# Patient Record
Sex: FEMALE | Race: WHITE | NOT HISPANIC OR LATINO | Employment: OTHER | RURAL
[De-identification: names, ages, dates, MRNs, and addresses within clinical notes are randomized per-mention and may not be internally consistent; named-entity substitution may affect disease eponyms.]

---

## 2020-06-08 ENCOUNTER — CONVERSION ENCOUNTER (OUTPATIENT)
Dept: CARDIOLOGY | Facility: CLINIC | Age: 80
End: 2020-06-08

## 2020-06-09 ENCOUNTER — OFFICE VISIT CONVERTED (OUTPATIENT)
Dept: CARDIOLOGY | Facility: CLINIC | Age: 80
End: 2020-06-09
Attending: INTERNAL MEDICINE

## 2021-05-10 NOTE — H&P
History and Physical      Patient Name: Edelmira Lemus   Patient ID: [id number]   Sex: Female   YOB: 1940    Primary Care Provider: No PCP No PCP Other   Referring Provider: Marshall Vora MD    Visit Date: June 9, 2020    Provider: Marshall Vora MD   Location: [facility]   Location Address: [address] [place] [address]  [id number]          Chief Complaint     Atrial fibrillation.  Hypertension.  Previous ischemic stroke.       History Of Present Illness  Consult requested by: Marshall Vora MD   Edelmira Lemus is a 79 year old female who is here for evaluation and management of atrial fibrillation, hypertension, and previous ischemic stroke. Edelmira is doing well. I have not seen her since 2018. She denies chest pain, palpitations, or excessive shortness of breath. She is compliant with her medications. No bleeding problems on anticoagulation. She has been on Flecainide for some time. This was increased to 75 mg b.i.d. last year, and she has not had any perceptible breakthrough arrhythmia.   PAST MEDICAL HISTORY: Atrial fibrillation; Depression; Hypertension; Prior ischemic stroke.   PSYCHOSOCIAL HISTORY: Previously smoked, but quit 22 years ago. Denies alcohol use. Rare caffeine intake. .   FAMILY HISTORY: Positive for hypertension. Negative for diabetes mellitus or heart disease.   CURRENT MEDICATIONS: Flecainide 75 mg b.i.d.; sertraline 1 tablet daily; Xarelto 20 mg daily; vitamin D daily. Dosage and frequency of the medications reviewed with the patient.   ALLERGIES: No known drug allergies.       Review of Systems  · Constitutional  o Admits  o : fatigue, good general health lately  o Denies  o : recent weight changes   · Eyes  o Denies  o : double vision  · HENT  o Denies  o : hearing loss or ringing, chronic sinus problem, swollen glands in neck  · Cardiovascular  o Denies  o : chest pain, palpitations (fast, fluttering, or skipping beats), swelling (feet, ankles,  "hands), shortness of breath while walking or lying flat  · Respiratory  o Denies  o : chronic or frequent cough, asthma or wheezing, COPD  · Gastrointestinal  o Denies  o : ulcers, nausea or vomiting  · Neurologic  o Admits  o : stroke  o Denies  o : lightheaded or dizzy, headaches  · Musculoskeletal  o Denies  o : joint pain, back pain  · Endocrine  o Denies  o : thyroid disease, diabetes, heat or cold intolerance, excessive thirst or urination  · Heme-Lymph  o Denies  o : bleeding or bruising tendency, anemia      Vitals  Date Time BP Position Site L\R Cuff Size HR RR TEMP (F) WT  HT  BMI kg/m2 BSA m2 O2 Sat HC       06/08/2020 11:30 /88 Sitting    55 - R  98.4 168lbs 0oz 5'  7\" 26.31 1.9     06/08/2020 11:31 /83 Sitting    54 - R                  Home readings in the 127/60s.       Physical Examination  · Constitutional  o Appearance  o : Elderly, White female, pleasant, in no acute distress.  · Head and Face  o HEENT  o : No pallor, anicteric. Eyes normal. Moist mucous membranes.  · Neck  o Inspection/Palpation  o : Supple. No hepatosplenomegaly.  o Jugular Veins  o : No JVD. No carotid bruits.  · Respiratory  o Auscultation of Lungs  o : Clear to auscultation bilaterally. No crackles or wheezing.  · Cardiovascular  o Heart  o : Soft parasternal systolic murmur.   · Gastrointestinal  o Abdominal Examination  o : Soft, non-distended. No palpable hepatosplenomegaly. Bowel sounds heard in all four quadrants.  · Musculoskeletal  o General  o : Normal muscle tone and strength.  · Skin and Subcutaneous Tissue  o General Inspection  o : No skin rashes.  · Extremities  o Extremities  o : Warm and well perfused. Distal pulses present. No pitting pedal edema.  · EKG  o EKG  o : Performed in the office today.  o Indications  o : Atrial fibrillation.  o Results  o : Sinus bradycardia. Left axis.  o Comparison  o : No previous EKG for comparison.   · Data  o Data  o : I reviewed her previous records from [facility].           Assessment     1.  Paroxysmal atrial fibrillation.  Clinically, the patient is currently controlled and stable on Flecainide therapy.         She is in sinus rhythm today.    2.  Embolic risk score elevated, chronic anticoagulation, stable.  3.  Hypertension, controlled by home readings.  4.  History of ischemic stroke.       Plan     Ms. Lemus is maintaining sinus rhythm.  I have refilled her Flecainide.  She will continue Xarelto therapy.  I have encouraged her to remain active and recommended a walking program.  She will follow up in one year unless symptoms arise.    It is a privilege to assist in her care.  Please let me know if you have any questions regarding her case.       FLAVIA Vora MD  CBD/vm             Electronically Signed by: Sandy Mejia-, Other -Author on Viry 15, 2020 09:39:07 AM  Electronically Co-signed by: Marshall Vora MD -Reviewer on Viry 15, 2020 12:32:32 PM

## 2021-05-15 VITALS
SYSTOLIC BLOOD PRESSURE: 157 MMHG | WEIGHT: 168 LBS | HEART RATE: 55 BPM | HEIGHT: 67 IN | TEMPERATURE: 98.4 F | DIASTOLIC BLOOD PRESSURE: 88 MMHG | BODY MASS INDEX: 26.37 KG/M2

## 2021-06-15 ENCOUNTER — OFFICE VISIT (OUTPATIENT)
Dept: CARDIOLOGY | Facility: CLINIC | Age: 81
End: 2021-06-15

## 2021-06-15 VITALS
WEIGHT: 161 LBS | SYSTOLIC BLOOD PRESSURE: 147 MMHG | HEIGHT: 66 IN | BODY MASS INDEX: 25.88 KG/M2 | DIASTOLIC BLOOD PRESSURE: 87 MMHG | HEART RATE: 54 BPM

## 2021-06-15 DIAGNOSIS — I63.9 ISCHEMIC STROKE (HCC): ICD-10-CM

## 2021-06-15 DIAGNOSIS — I10 ESSENTIAL HYPERTENSION: ICD-10-CM

## 2021-06-15 DIAGNOSIS — I48.0 PAROXYSMAL ATRIAL FIBRILLATION (HCC): Primary | ICD-10-CM

## 2021-06-15 PROCEDURE — 99213 OFFICE O/P EST LOW 20 MIN: CPT | Performed by: INTERNAL MEDICINE

## 2021-06-15 PROCEDURE — 93000 ELECTROCARDIOGRAM COMPLETE: CPT | Performed by: INTERNAL MEDICINE

## 2021-06-15 RX ORDER — SERTRALINE HYDROCHLORIDE 100 MG/1
100 TABLET, FILM COATED ORAL DAILY
COMMUNITY
Start: 2021-03-21

## 2021-06-15 RX ORDER — FLECAINIDE ACETATE 50 MG/1
50 TABLET ORAL DAILY
COMMUNITY
Start: 2021-05-20 | End: 2021-06-15 | Stop reason: SDUPTHER

## 2021-06-15 RX ORDER — RIVAROXABAN 20 MG/1
20 TABLET, FILM COATED ORAL DAILY
COMMUNITY
Start: 2021-04-02 | End: 2021-06-15 | Stop reason: SDUPTHER

## 2021-06-15 RX ORDER — FLECAINIDE ACETATE 50 MG/1
50 TABLET ORAL DAILY
Qty: 180 TABLET | Refills: 3 | Status: SHIPPED | OUTPATIENT
Start: 2021-06-15 | End: 2022-06-09 | Stop reason: SDUPTHER

## 2021-06-15 RX ORDER — RIVAROXABAN 20 MG/1
20 TABLET, FILM COATED ORAL DAILY
Qty: 90 TABLET | Refills: 3 | Status: SHIPPED | OUTPATIENT
Start: 2021-06-15 | End: 2022-06-13

## 2021-06-15 NOTE — PROGRESS NOTES
Name: Edelmira Lemus    Date: 6/15/2021  MRN:  [id number]  :  1940      REFERRING/PRIMARY PROVIDER:  Marylin Hawthorne MD      No chief complaint on file.  Chief complaint, follow-up    HPI: Edelmira Lemus is a 80 y.o. female who presents today for follow-up evaluation and management of palpitations, paroxysmal atrial fibrillation, chronic anticoagulant use.  Ms. Lemus is doing well.  No new health problems, no symptomatic atrial fibrillation over the past year.  No bleeding problems on anticoagulation.    Past Medical History:   Diagnosis Date   • Abnormal ECG    • Atrial fibrillation (CMS/HCC)    • Stroke (CMS/HCC)        History reviewed. No pertinent surgical history.    Social History     Socioeconomic History   • Marital status: Unknown     Spouse name: Not on file   • Number of children: Not on file   • Years of education: Not on file   • Highest education level: Not on file   Tobacco Use   • Smoking status: Never Smoker   Vaping Use   • Vaping Use: Never used   Substance and Sexual Activity   • Alcohol use: Never   • Drug use: Never       Family History   Problem Relation Age of Onset   • Heart disease Mother    • Hyperlipidemia Mother    • Hypertension Mother    • Heart disease Father    • Hyperlipidemia Father    • Hypertension Father         ROS:   Review of Systems   Constitutional: Negative.   HENT: Negative.    Cardiovascular: Negative.    Respiratory: Negative.    Hematologic/Lymphatic: Negative.        No Known Allergies      Current Outpatient Medications:   •  flecainide (TAMBOCOR) 50 MG tablet, Take 1 tablet by mouth Daily. 1 1/2 tabs every day, Disp: 180 tablet, Rfl: 3  •  sertraline (ZOLOFT) 100 MG tablet, Take 100 mg by mouth Daily., Disp: , Rfl:   •  vitamin D3 (Vitamin D) 125 MCG (5000 UT) capsule capsule, Take 5,000 Units by mouth Daily., Disp: , Rfl:   •  Xarelto 20 MG tablet, Take 1 tablet by mouth Daily., Disp: 90 tablet, Rfl: 3    Vitals:    06/15/21 1455 06/15/21 1456  "06/15/21 1457   BP: 149/91 150/93 147/87   Pulse: 56 54 54   Weight: 73 kg (161 lb)     Height: 167.6 cm (66\")       Body mass index is 25.99 kg/m².        PHYSICAL EXAM:      General Appearance:   · well developed  · well nourished  HENT:   · oropharynx moist  · lips not cyanotic  Neck:  · thyroid not enlarged  · supple  Respiratory:  · no respiratory distress  · normal breath sounds  · no rales  Cardiovascular:  · no jugular venous distention  · regular rhythm  · apical impulse normal  · S1 normal, S2 normal  · no S3, no S4   · no murmur  · no rub, no thrill  · carotid pulses normal; no bruit  · pedal pulses normal  · lower extremity edema: none    Psychiatric:  · judgement and insight appropriate  · normal mood and affect    RESULTS:       ECG 12 Lead    Date/Time: 6/15/2021 3:28 PM  Performed by: TRUONG Vora MD  Authorized by: TRUONG Vora MD   Comparison: compared with previous ECG   Comparison to previous ECG: No change from June 2020  Rhythm: sinus rhythm  Conduction: left anterior fascicular block  QRS axis: left  Other findings: left ventricular hypertrophy                   Basic Metabolic Panel    Sodium No results found for: NA   Potassium No results found for: K   Chloride No results found for: CL   Bicarbonate No results found for: PLASMABICARB   BUN No results found for: BUN   Creatinine No results found for: CREATININE   Calcium No results found for: CALCIUM   Glucose      No components found for: GLUCOSE.*              Patient's Body mass index is 25.99 kg/m². indicating that she is within normal range (BMI 18.5-24.9). No BMI management plan needed..        ASSESSMENT:  Encounter Diagnoses   Name Primary?   • Paroxysmal atrial fibrillation (CMS/HCC) Yes   • Essential hypertension    • Ischemic stroke (CMS/HCC)          PLAN:    1.  Regarding her atrial fibrillation, she is maintaining sinus rhythm  2.  Antiarrhythmic drug therapy is stable, continue flecainide at current dose  3.  Continue " anticoagulation for stroke prevention    Refilled her medications today  Follow-up in 1 year otherwise                C Camron Vora MD  6/15/2021    15:25 EDT

## 2022-06-09 RX ORDER — FLECAINIDE ACETATE 50 MG/1
TABLET ORAL
Qty: 135 TABLET | Refills: 0 | Status: SHIPPED | OUTPATIENT
Start: 2022-06-09 | End: 2022-06-21 | Stop reason: SDUPTHER

## 2022-06-13 RX ORDER — RIVAROXABAN 20 MG/1
TABLET, FILM COATED ORAL
Qty: 90 TABLET | Refills: 0 | Status: SHIPPED | OUTPATIENT
Start: 2022-06-13 | End: 2022-09-20

## 2022-06-21 ENCOUNTER — OFFICE VISIT (OUTPATIENT)
Dept: CARDIOLOGY | Facility: CLINIC | Age: 82
End: 2022-06-21

## 2022-06-21 VITALS
HEART RATE: 53 BPM | SYSTOLIC BLOOD PRESSURE: 139 MMHG | DIASTOLIC BLOOD PRESSURE: 86 MMHG | BODY MASS INDEX: 25.58 KG/M2 | WEIGHT: 163 LBS | HEIGHT: 67 IN

## 2022-06-21 DIAGNOSIS — I48.0 PAROXYSMAL ATRIAL FIBRILLATION: ICD-10-CM

## 2022-06-21 DIAGNOSIS — I10 ESSENTIAL HYPERTENSION: ICD-10-CM

## 2022-06-21 DIAGNOSIS — E78.2 HYPERLIPEMIA, MIXED: Primary | ICD-10-CM

## 2022-06-21 PROCEDURE — 99214 OFFICE O/P EST MOD 30 MIN: CPT | Performed by: INTERNAL MEDICINE

## 2022-06-21 RX ORDER — ROSUVASTATIN CALCIUM 20 MG/1
20 TABLET, COATED ORAL DAILY
Qty: 90 TABLET | Refills: 3 | Status: SHIPPED | OUTPATIENT
Start: 2022-06-21

## 2022-06-21 RX ORDER — FLECAINIDE ACETATE 50 MG/1
TABLET ORAL
Qty: 270 TABLET | Refills: 3 | Status: SHIPPED | OUTPATIENT
Start: 2022-06-21

## 2022-06-21 RX ORDER — LOSARTAN POTASSIUM 50 MG/1
50 TABLET ORAL EVERY MORNING
COMMUNITY
Start: 2022-05-20

## 2022-06-21 NOTE — PROGRESS NOTES
Chief Complaint  Atrial Fibrillation (Paroxysmal), Hypertension, and Ischemic Stroke    Subjective            Edelmira Lemus presents to [facility] CARDIOLOGY  History of Present Illness    Edelmira is here for follow-up evaluation management of paroxysmal atrial fibrillation, hypertension, mixed hyperlipidemia.  Since last visit she is doing well.  She denies chest pain, palpitations, excessive shortness of breath.  She is compliant with her medications.  No bleeding problems on anticoagulation.    PMH  Past Medical History:   Diagnosis Date   • Abnormal ECG    • Atrial fibrillation (HCC)    • Stroke (HCC)          SURGICALHX  History reviewed. No pertinent surgical history.     SOC  Social History     Socioeconomic History   • Marital status:    Tobacco Use   • Smoking status: Never Smoker   • Smokeless tobacco: Never Used   Vaping Use   • Vaping Use: Never used   Substance and Sexual Activity   • Alcohol use: Never   • Drug use: Never         FAMHX  Family History   Problem Relation Age of Onset   • Heart disease Mother    • Hyperlipidemia Mother    • Hypertension Mother    • Heart disease Father    • Hyperlipidemia Father    • Hypertension Father           ALLERGY  No Known Allergies     MEDSCURRENT    Current Outpatient Medications:   •  flecainide (TAMBOCOR) 50 MG tablet, Take 1 1/2 tablets by mouth twice a day, Disp: 270 tablet, Rfl: 3  •  losartan (COZAAR) 50 MG tablet, Take 50 mg by mouth Every Morning., Disp: , Rfl:   •  sertraline (ZOLOFT) 100 MG tablet, Take 100 mg by mouth Daily., Disp: , Rfl:   •  vitamin D3 125 MCG (5000 UT) capsule capsule, Take 5,000 Units by mouth Daily., Disp: , Rfl:   •  Xarelto 20 MG tablet, Take 1 tablet by mouth once daily, Disp: 90 tablet, Rfl: 0  •  rosuvastatin (CRESTOR) 20 MG tablet, Take 1 tablet by mouth Daily., Disp: 90 tablet, Rfl: 3      Review of Systems   Cardiovascular: Negative for chest pain, irregular heartbeat and palpitations.  "  Respiratory: Negative for shortness of breath.         Objective     /86   Pulse 53   Ht 170.2 cm (67\")   Wt 73.9 kg (163 lb)   BMI 25.53 kg/m²       General Appearance:   · well developed  · well nourished  HENT:   · oropharynx moist  · lips not cyanotic  Neck:  · thyroid not enlarged  · supple  Respiratory:  · no respiratory distress  · normal breath sounds  · no rales  Cardiovascular:  · no jugular venous distention  · regular rhythm  · apical impulse normal  · S1 normal, S2 normal  · no S3, no S4   · no murmur  · no rub, no thrill  · carotid pulses normal; no bruit  · pedal pulses normal  · lower extremity edema: none    Musculoskeletal:  · no clubbing of fingers.   · normocephalic, head atraumatic  Skin:   · warm, dry  Psychiatric:  · judgement and insight appropriate  · normal mood and affect      Result Review :             Data reviewed: Primary care records reviewed, laboratory studies reviewed, recent      Procedures             Assessment and Plan        ASSESSMENT:  Encounter Diagnoses   Name Primary?   • Hyperlipemia, mixed Yes   • Paroxysmal atrial fibrillation (HCC)    • Essential hypertension          PLAN:    1.  Paroxysmal atrial fibrillation, the patient is maintaining sinus rhythm.  In June I refilled her flecainide but it was filled out as once a day, I corrected this today.  The patient continued to take it twice a day and has not had any clinical recurrence of atrial fibrillation.  Based on embolic risk factors continue anticoagulation  2.  Mixed hyperlipidemia, based on the pooled cohort equation, her estimated 10-year cardiovascular risk is above 10%, I am starting Crestor 20 mg daily with goal LDL less than 70, repeat lipid panel in 3 months.  3.  Essential hypertension, stable, continue current medical therapy.    Annual follow-up will be arranged      Patient was given instructions and counseling regarding her condition or for health maintenance advice. Please see " specific information pulled into the AVS if appropriate.             C Camron Vora MD  6/21/2022    11:57 EDT

## 2022-09-20 RX ORDER — RIVAROXABAN 20 MG/1
TABLET, FILM COATED ORAL
Qty: 90 TABLET | Refills: 3 | Status: SHIPPED | OUTPATIENT
Start: 2022-09-20

## 2023-04-12 RX ORDER — ROSUVASTATIN CALCIUM 20 MG/1
20 TABLET, COATED ORAL DAILY
Qty: 90 TABLET | Refills: 3 | Status: SHIPPED | OUTPATIENT
Start: 2023-04-12

## 2023-09-18 RX ORDER — RIVAROXABAN 20 MG/1
TABLET, FILM COATED ORAL
Qty: 90 TABLET | Refills: 3 | Status: SHIPPED | OUTPATIENT
Start: 2023-09-18

## 2024-07-02 ENCOUNTER — OFFICE VISIT (OUTPATIENT)
Dept: CARDIOLOGY | Facility: CLINIC | Age: 84
End: 2024-07-02
Payer: MEDICARE

## 2024-07-02 VITALS
HEIGHT: 67 IN | BODY MASS INDEX: 23.39 KG/M2 | HEART RATE: 50 BPM | DIASTOLIC BLOOD PRESSURE: 63 MMHG | SYSTOLIC BLOOD PRESSURE: 144 MMHG | WEIGHT: 149 LBS

## 2024-07-02 DIAGNOSIS — I10 HYPERTENSION, ESSENTIAL: ICD-10-CM

## 2024-07-02 DIAGNOSIS — E78.2 HYPERLIPEMIA, MIXED: ICD-10-CM

## 2024-07-02 DIAGNOSIS — I48.0 PAROXYSMAL ATRIAL FIBRILLATION: ICD-10-CM

## 2024-07-02 DIAGNOSIS — R60.0 BILATERAL LEG EDEMA: Primary | ICD-10-CM

## 2024-07-02 PROCEDURE — 1160F RVW MEDS BY RX/DR IN RCRD: CPT | Performed by: INTERNAL MEDICINE

## 2024-07-02 PROCEDURE — 99214 OFFICE O/P EST MOD 30 MIN: CPT | Performed by: INTERNAL MEDICINE

## 2024-07-02 PROCEDURE — 1159F MED LIST DOCD IN RCRD: CPT | Performed by: INTERNAL MEDICINE

## 2024-07-02 RX ORDER — APIXABAN 5 MG/1
1 TABLET, FILM COATED ORAL EVERY 12 HOURS SCHEDULED
COMMUNITY
Start: 2024-04-30

## 2024-07-02 RX ORDER — FUROSEMIDE 20 MG/1
20 TABLET ORAL DAILY
Qty: 90 TABLET | Refills: 3 | Status: SHIPPED | OUTPATIENT
Start: 2024-07-02

## 2024-07-02 NOTE — PROGRESS NOTES
Chief Complaint  Atrial Fibrillation, Hyperlipidemia, and Hypertension    Subjective            Edelmira Lemus presents to [facility] CARDIOLOGY      Edelmira is here for follow-up evaluation management of paroxysmal atrial fibrillation, hypertension, mixed hyperlipidemia.  She has been doing relatively well.  She does complain of lower extremity edema.  She denies excessive shortness of breath, chest pain or palpitations.  She is compliant with her medical therapy.    PMH  Past Medical History:   Diagnosis Date    Abnormal ECG     Atrial fibrillation     Stroke          SURGICALHX  History reviewed. No pertinent surgical history.     SOC  Social History     Socioeconomic History    Marital status:    Tobacco Use    Smoking status: Never    Smokeless tobacco: Never   Vaping Use    Vaping status: Never Used   Substance and Sexual Activity    Alcohol use: Never    Drug use: Never    Sexual activity: Defer         FAMHX  Family History   Problem Relation Age of Onset    Heart disease Mother     Hyperlipidemia Mother     Hypertension Mother     Heart disease Father     Hyperlipidemia Father     Hypertension Father           ALLERGY  No Known Allergies     MEDSCURRENT    Current Outpatient Medications:     Eliquis 5 MG tablet tablet, Take 1 tablet by mouth Every 12 (Twelve) Hours., Disp: , Rfl:     flecainide (TAMBOCOR) 50 MG tablet, Take 1 1/2 tablets by mouth twice a day, Disp: 270 tablet, Rfl: 3    losartan (COZAAR) 50 MG tablet, Take 1 tablet by mouth Every Morning., Disp: , Rfl:     rosuvastatin (CRESTOR) 20 MG tablet, Take 1 tablet by mouth Daily., Disp: 90 tablet, Rfl: 3    sertraline (ZOLOFT) 100 MG tablet, Take 1 tablet by mouth Daily., Disp: , Rfl:     Xarelto 20 MG tablet, Take 1 tablet by mouth once daily (Patient not taking: Reported on 7/2/2024), Disp: 90 tablet, Rfl: 3    amLODIPine (NORVASC) 5 MG tablet, Take 1 tablet by mouth Daily. (Patient not taking: Reported on 7/2/2024), Disp:  ", Rfl:     calcium carb-cholecalciferol 600-10 MG-MCG tablet per tablet, Take 1 tablet by mouth Daily. (Patient not taking: Reported on 7/2/2024), Disp: , Rfl:     furosemide (LASIX) 20 MG tablet, Take 1 tablet by mouth Daily., Disp: 90 tablet, Rfl: 3    vitamin D3 125 MCG (5000 UT) capsule capsule, Take 1 capsule by mouth Daily. (Patient not taking: Reported on 7/2/2024), Disp: , Rfl:       Review of Systems   Cardiovascular:  Positive for leg swelling. Negative for chest pain, irregular heartbeat and palpitations.   Respiratory:  Negative for shortness of breath.         Objective     /63   Pulse 50   Ht 170.2 cm (67\")   Wt 67.6 kg (149 lb)   BMI 23.34 kg/m²       General Appearance:   well developed  well nourished  HENT:   oropharynx moist  lips not cyanotic  Neck:  thyroid not enlarged  supple  Respiratory:  no respiratory distress  normal breath sounds  no rales  Cardiovascular:  no jugular venous distention  regular rhythm  apical impulse normal  S1 normal, S2 normal  no S3, no S4   no murmur  no rub, no thrill  carotid pulses normal; no bruit  pedal pulses normal  lower extremity edema: 2+ bilateral edema well-demarcated below the mid pretibial area, venous varicosities noted  Musculoskeletal:  no clubbing of fingers.   normocephalic, head atraumatic  Skin:   warm, dry  Psychiatric:  judgement and insight appropriate  normal mood and affect      Result Review :             Data reviewed : Primary care records reviewed, LDL controlled, TSH normal, CMP normal, CBC normal      Procedures             Assessment and Plan        ASSESSMENT:  Encounter Diagnoses   Name Primary?    Bilateral leg edema Yes    Paroxysmal atrial fibrillation     Hyperlipemia, mixed     Hypertension, essential          PLAN:    1.  Paroxysmal atrial fibrillation, the patient is maintaining sinus rhythm.  Continue current dose of flecainide and anticoagulation  2.  Mixed hyperlipidemia, well controlled, continue statin " therapy  3.  Essential hypertension, has been stable, continue current medical therapy  4.  Lower extremity edema-likely due to venous insufficiency, sedentary status and age.  Recommend low-sodium nutrition, leg elevation when recumbent, consider compression stockings.  I am adding low-dose Lasix daily, then as needed when improved, can check a basic metabolic panel in about 2 weeks    Follow-up in about 3 months for early checkup otherwise annual visits      Patient was given instructions and counseling regarding her condition or for health maintenance advice. Please see specific information pulled into the AVS if appropriate.             TRUONG Vora MD  7/2/2024    10:01 EDT

## 2024-10-02 ENCOUNTER — TELEPHONE (OUTPATIENT)
Dept: CARDIOLOGY | Facility: CLINIC | Age: 84
End: 2024-10-02
Payer: MEDICARE

## 2024-10-02 ENCOUNTER — OFFICE VISIT (OUTPATIENT)
Dept: CARDIOLOGY | Facility: CLINIC | Age: 84
End: 2024-10-02
Payer: MEDICARE

## 2024-10-02 VITALS
DIASTOLIC BLOOD PRESSURE: 69 MMHG | SYSTOLIC BLOOD PRESSURE: 139 MMHG | WEIGHT: 153.4 LBS | HEIGHT: 67 IN | HEART RATE: 47 BPM | BODY MASS INDEX: 24.08 KG/M2

## 2024-10-02 DIAGNOSIS — E78.2 HYPERLIPEMIA, MIXED: ICD-10-CM

## 2024-10-02 DIAGNOSIS — R60.0 BILATERAL LEG EDEMA: ICD-10-CM

## 2024-10-02 DIAGNOSIS — I48.0 PAROXYSMAL ATRIAL FIBRILLATION: ICD-10-CM

## 2024-10-02 DIAGNOSIS — R60.0 BILATERAL LEG EDEMA: Primary | ICD-10-CM

## 2024-10-02 DIAGNOSIS — I10 HYPERTENSION, ESSENTIAL: ICD-10-CM

## 2024-10-02 RX ORDER — FUROSEMIDE 40 MG
40 TABLET ORAL DAILY PRN
Qty: 90 TABLET | Refills: 1 | Status: SHIPPED | OUTPATIENT
Start: 2024-10-02

## 2024-10-02 NOTE — PROGRESS NOTES
Chief Complaint  Follow-up (3 month ) and Leg Swelling    Subjective            Edelmira Lemus presents to [facility] CARDIOLOGY  History of Present Illness    Ms. Lemus is here for follow-up evaluation management of paroxysmal atrial fibrillation, essential hypertension, mixed hyperlipidemia.  Recently she has had an increase in bilateral lower extremity edema secondary to venous insufficiency.  She was started on 20 mg of Lasix.  She reports she did not have a good diuretic effect from that dose so she stopped taking altogether.  She did have labs after starting the Lasix, I am requesting those today.    PMH  Past Medical History:   Diagnosis Date    Abnormal ECG     Atrial fibrillation     Stroke          SURGICALHX  History reviewed. No pertinent surgical history.     SOC  Social History     Socioeconomic History    Marital status:    Tobacco Use    Smoking status: Never    Smokeless tobacco: Never   Vaping Use    Vaping status: Never Used   Substance and Sexual Activity    Alcohol use: Never    Drug use: Never    Sexual activity: Defer         FAMHX  Family History   Problem Relation Age of Onset    Heart disease Mother     Hyperlipidemia Mother     Hypertension Mother     Heart disease Father     Hyperlipidemia Father     Hypertension Father           ALLERGY  No Known Allergies     MEDSCURRENT    Current Outpatient Medications:     Eliquis 5 MG tablet tablet, Take 1 tablet by mouth Every 12 (Twelve) Hours., Disp: , Rfl:     flecainide (TAMBOCOR) 50 MG tablet, Take 1 1/2 tablets by mouth twice a day, Disp: 270 tablet, Rfl: 3    furosemide (LASIX) 40 MG tablet, Take 1 tablet by mouth Daily As Needed (Swelling)., Disp: 90 tablet, Rfl: 1    losartan (COZAAR) 50 MG tablet, Take 1 tablet by mouth Every Morning., Disp: , Rfl:     rosuvastatin (CRESTOR) 20 MG tablet, Take 1 tablet by mouth Daily., Disp: 90 tablet, Rfl: 3      Review of Systems   Cardiovascular:  Positive for leg swelling.  "Negative for chest pain and dyspnea on exertion.        Objective     /69   Pulse (!) 47   Ht 170.2 cm (67\")   Wt 69.6 kg (153 lb 6.4 oz)   BMI 24.03 kg/m²       General Appearance:   well developed  well nourished  HENT:   oropharynx moist  lips not cyanotic  Neck:  thyroid not enlarged  supple  Respiratory:  no respiratory distress  normal breath sounds  no rales  Cardiovascular:  no jugular venous distention  regular rhythm  apical impulse normal  S1 normal, S2 normal  no S3, no S4   no murmur  no rub, no thrill  carotid pulses normal; no bruit  pedal pulses normal  lower extremity edema: 1+ pitting bilaterally  Musculoskeletal:  no clubbing of fingers.   normocephalic, head atraumatic  Skin:   warm, dry  Psychiatric:  judgement and insight appropriate  normal mood and affect      Result Review :     The following data was reviewed by: JARAD Crespo on 10/02/2024:                   Procedures      Edelmira Lemus  reports that she has never smoked. She has never used smokeless tobacco. I have educated her on the risk of diseases from using tobacco products such as cancer, COPD, and heart disease.                   Assessment and Plan        ASSESSMENT:  Encounter Diagnoses   Name Primary?    Bilateral leg edema Yes    Paroxysmal atrial fibrillation     Hypertension, essential     Hyperlipemia, mixed          PLAN:    1.  Bilateral lower extremity edema, did not have a good diuretic effect with 20 mg of Lasix.  Will increase to 40 mg and she will take this on an as-needed basis when she is going to be home near bathroom.  Requesting BMP today.  2.  Essential hypertension, controlled.  Continue losartan 50 mg daily.  3.  Mixed hyperlipidemia, stable on statin therapy, continue.  4.  Paroxysmal atrial fibrillation, clinically maintaining sinus rhythm.  Continue flecainide 50 mg twice daily and Eliquis 5 mg twice daily.    Follow-up annually.      Patient was given instructions and counseling " regarding her condition or for health maintenance advice. Please see specific information pulled into the AVS if appropriate.           Marylin Agudelo, APRN   10/2/2024  11:25 EDT

## 2024-10-02 NOTE — TELEPHONE ENCOUNTER
----- Message from TRUONG Vora sent at 10/2/2024 11:56 AM EDT -----  Metabolic panel was normal, continue current medical therapy and follow-up as planned

## 2024-10-31 ENCOUNTER — TELEPHONE (OUTPATIENT)
Dept: CARDIOLOGY | Facility: CLINIC | Age: 84
End: 2024-10-31
Payer: MEDICARE

## 2024-10-31 NOTE — TELEPHONE ENCOUNTER
BANDAR patient son. Patient son reports multiple episodes of Atrial Fibrillation over the past week. Patient reports palpitations. Denies any shortness of air or chest pain. Patient has not checked heart rate or blood pressure. Patient reports swelling in lower extremities that does not improve overnight. Reports taking Lasix 40mg PRN yesterday. Compliant with medications : Flecainide 50 mg 1.5 tabs BID, Losartan 50 mg QAM, Eliquis 5 mg daily.    Patient  passed away in August and has been stressed since. Patient restarted on sertraline several weeks ago. Advised to check blood pressure and heart rate for 7-10 days and turn in. Advised to maintain a low salt diet, elevated legs, and wear compression stockings.

## 2024-11-01 NOTE — TELEPHONE ENCOUNTER
Agree.  Can see patient for a follow-up if symptoms persist, should have openings next week if needed.